# Patient Record
Sex: FEMALE | Race: ASIAN | NOT HISPANIC OR LATINO
[De-identification: names, ages, dates, MRNs, and addresses within clinical notes are randomized per-mention and may not be internally consistent; named-entity substitution may affect disease eponyms.]

---

## 2019-02-15 ENCOUNTER — APPOINTMENT (OUTPATIENT)
Dept: BREAST CENTER | Facility: CLINIC | Age: 49
End: 2019-02-15
Payer: MEDICAID

## 2019-02-15 VITALS
WEIGHT: 134 LBS | SYSTOLIC BLOOD PRESSURE: 110 MMHG | DIASTOLIC BLOOD PRESSURE: 80 MMHG | TEMPERATURE: 98.1 F | BODY MASS INDEX: 22.88 KG/M2 | HEIGHT: 64 IN

## 2019-02-15 DIAGNOSIS — R92.8 OTHER ABNORMAL AND INCONCLUSIVE FINDINGS ON DIAGNOSTIC IMAGING OF BREAST: ICD-10-CM

## 2019-02-15 DIAGNOSIS — Z80.3 FAMILY HISTORY OF MALIGNANT NEOPLASM OF BREAST: ICD-10-CM

## 2019-02-15 DIAGNOSIS — Z80.42 FAMILY HISTORY OF MALIGNANT NEOPLASM OF PROSTATE: ICD-10-CM

## 2019-02-15 PROCEDURE — 99203 OFFICE O/P NEW LOW 30 MIN: CPT

## 2019-02-19 PROBLEM — Z80.42 FAMILY HISTORY OF MALIGNANT NEOPLASM OF PROSTATE: Status: ACTIVE | Noted: 2019-02-19

## 2019-02-19 PROBLEM — Z80.3 FAMILY HISTORY OF BREAST CANCER: Status: ACTIVE | Noted: 2019-02-19

## 2019-03-20 NOTE — ASSESSMENT
[FreeTextEntry1] : Veronica is a 48 perimenopausal F who presents with benign high risk disease and bilateral benign breast biopsies. \par \par She has no suspicious findings on exam.  Her R breast calcifications were biopsy proven fibrocystic changes and her left breast mass @12:00, 4 cm FN was a fibroadenoma.  She will be due for a R dx mammogram and L breast US in 6 months.  This will be scheduled for her today. \par \par We discussed dense breasts.  Increasing breast density has been found to increase ones risk of breast cancer, but at this time, there is no clear indication for additional imaging in this setting, as both US and MRI have not been found to improve survival.  One can consider bilateral screening US.  However, out of 1000 women screened, the use of routine US will only identify an additional 3-5 cancers.  The use of US was found to increase the likelihood of undergoing more imaging and more biopsies.  She does have dense breasts.  We have decided to proceed with screening bilateral breast US at this time.  This will be scheduled with her next screening mammogram.\par \par We discussed fibroadenomas.  These are benign lesions without any malignant potential.  They are hormonally influenced and can increase or decrease in size and can also regress spontaneously.  They are considered proliferative lesions without atypia.  Patients with these lesions have been found to have a slightly increased relative risk of breast cancer compared to the reference population.  Surgical excision is recommended when the diagnosis in unclear, the lesion is causing pain or breast deformity, or if it is rapidly enlarging. \par \par The reason that we recommend surgical excision for a rapidly enlarging fibroadenoma is because there is a possibility that this could be a Phyllodes Tumor.  The treatment of this lesion is wide local excision with 1 cm margins.  A sentinel lymph node would not be necessary as this disease very rarely spreads to the lymph nodes. She is asymptomatic from her fibroadenoma so no acute surgical intervention will be performed at this time. \par \par Because of a family history of breast cancer in her mother, her risk assessment is as follows: \par \par RISK ASSESSMENT: \par Miriam\par 5yr -- 2.2%\par lifetime -- 19.4%\par \par TC v6 \par 5yr -- 3%\par lifetime -- 19.1%\par \par This puts her in the high risk category for breast cancer because she has a lifetime risk of >20%.  She qualifies for annual screening MRIs which would be done in an alternating fashion with her screening mammograms such that an imaging study and clinical breast exam would be performed every 6 months.  The use of MRIs have not been shown to prolong survival, however out of 1000 women screened, an additional 14-15 cancers will be identified.  The use of MRIs, has, however, been shown to increase the number of procedures and additional imaging because although it is a very sensitive test, it is not as specific.  This was discussed with the patient and she would like to proceed with screening MRIs.  This will be scheduled for 7/2019.\par  \par All of her questions were answered.  She knows to call with any further questions or concerns. \par \par PLAN\par -R dx mammogram and L breast US in 6 months \par -b/l breast MRI in 6 months \par -f/up after imaging

## 2019-03-20 NOTE — HISTORY OF PRESENT ILLNESS
[FreeTextEntry1] : Veronica is a 48 perimenopausal F who presents with benign high risk disease secondary to a family history of breast cancer in her mother and b/l benign breast biopsies. \par \par She has no breast related complaints at this time.  She denies any breast pain, has not palpated any new palpable masses in either breast and denies any nipple discharge or retraction.  \par \par Her first screening mammogram and subsequent work up was as follows: \par 19 -- b/l screening mammogram \par -heterogenously dense breasts \par -grouped calcifications in right breast @11:00\par -asymmetry in L, posterior depth, central to nipple \par -no other significant masses, calcifications bilaterally \par BIRADS 0 \par \par 19 -- b/l dx mammogram and L breast US \par -1 cm area of grouped amorphous calcifications in R @11:00, mid depth --> BIOPSY \par -9mm oval nodule with circumscribed margin in L @12:00, mid depth, correlates with US --> US BIOPSY\par L breast US \par -@12:00, 4 cm FN, 9 mm oval nodule with circumscribed margins, hypoechoic -> US BIOPSY \par \par 19 -- L US guided biopsy @12:00, 4 cm FN\par -fibroadenoma, fibrocystic changes \par \par 19 -- R stereotactic guided biopsy @12:00\par -columnar cell changes, fibrocystic changes\par \par HISTORICAL RISK FACTORS: \par -2 prior breast biopsies as above\par -family history of breast cancer in her mother, dx at age 68 \par -\par -no prior OCP use \par \par RISK ASSESSMENT: \par Miriam\par 5yr -- 2.2%\par lifetime -- 19.4%\par \par TC v6 \par 5yr -- 3%\par lifetime -- 19.1%

## 2019-03-20 NOTE — PAST MEDICAL HISTORY
[Menstruating] : The patient is menstruating [Menarche Age ____] : age at menarche was [unfilled] [Definite ___ (Date)] : the last menstrual period was [unfilled] [Total Preg ___] : G[unfilled] [History of Hormone Replacement Treatment] : has no history of hormone replacement treatment [FreeTextEntry5] : uterine myomectomy  [FreeTextEntry6] : denies [FreeTextEntry7] : denies [FreeTextEntry8] : denies

## 2019-03-20 NOTE — REVIEW OF SYSTEMS
Alert and oriented x 3, normal mood and affect, no apparent risk to self or others. [Abn Vaginal Bleeding] : unexplained vaginal bleeding [As Noted in HPI] : as noted in HPI [Negative] : Heme/Lymph [Fever] : no fever [Feeling Poorly] : not feeling poorly [Chills] : no chills [Skin Lesions] : no skin lesions [Feeling Tired] : not feeling tired [Breast Pain] : no breast pain [Skin Wound] : no skin wound [Breast Lump] : no breast lump [Hot Flashes] : no hot flashes

## 2019-03-20 NOTE — DATA REVIEWED
[FreeTextEntry1] : #[id number] MAMMO TOMOSYNTHESIS SCREENING BILATERAL: 1/4/2019\par \par CLINICAL INDICATION: Patient does report CBE in the past year.\par \par No prior exams were available for comparison.\par Tomosynthesis and 2D imaging of the breast(s) were performed.\par The tissue of both breasts is heterogeneously dense. This may lower the sensitivity of mammography.\par There are grouped calcifications in the right breast at 11 o'clock middle depth.\par There is an asymmetry in the left breast posterior depth central to the nipple seen on the craniocaudal view only.\par No other significant masses or calcifications are seen in either breast.\par Current study was also evaluated with a computer aided detection (CAD) system.\par \par IMPRESSION: INCOMPLETE: NEEDS ADDITIONAL IMAGING EVALUATION\par The grouped calcifications in the right breast at 11 o'clock middle depth are indeterminate. Magnification and lateral views are recommended.\par The asymmetry in the left breast posterior depth central to the nipple seen on the craniocaudal view only is indeterminate. Spot compression and lateral views as well as an ultrasound are recommended.\par \par \par \par The patient has been or will be contacted.\par \par \par Electronic Signature: I personally reviewed the images and agree with this report. Final Report: Signed by Attending Nubia Acosta MD\par /lucio:1/4/2019 10:41:14\par \par \par letter sent: Additional Imaging Needed\par Mammogram BI-RADS: 0 Incomplete: Needs Additional Imaging Evaluation\par \par  \par #[id number] MAMMO TOMOSYNTHESIS CALLBACK FROM SCREENING BILATERAL: 1/17/2019\par No prior exams were available for comparison.\par Tomosynthesis and 2D imaging of the breast(s) were performed.\par The tissue of both breasts is heterogeneously dense. This may lower the sensitivity of mammography.\par There is a 1 cm area of grouped amorphous calcifications in the right breast at 11 o'clock middle depth. These are seen in additional views.\par There is a 9 mm oval nodule with a circumscribed margin in the left breast at 12 o'clock middle depth. This is seen in additional views. This correlates with ultrasound findings.\par No other significant masses or calcifications are seen in either breast.\par Current study was also evaluated with a computer aided detection (CAD) system.\par \par IMPRESSION: SUSPICIOUS OF MALIGNANCY\par 1 cm area of grouped amorphous calcifications in the right breast at 11 o'clock middle depth. A stereotactic biopsy is recommended.\par Findings and recommendations were discussed with the patient at the time of the exam.\par 9 mm oval nodule in the left breast at 12 o'clock middle depth. An ultrasound guided biopsy is recommended.\par \par \par \par The patient will be sent a letter.\par \par \par Electronic Signature: I personally reviewed the images and agree with this report. Final Report: Signed by Kristina Saldana MD\par rb/:1/17/2019 12:59:00\par \par \par letter sent: Biopsy Required\par Mammogram BI-RADS: 4 Suspicious abnormality\par \par \par \par  \par #[id number] US BREAST LIMITED LEFT: 1/17/2019\par No prior exams were available for comparison.\par Ultrasound of the left breast was performed.\par There is a 9 mm oval nodule with a circumscribed margin in the left breast at 12 o'clock middle depth. This oval nodule is hypoechoic. This correlates with mammography findings.\par \par \par IMPRESSION: SUSPICIOUS OF MALIGNANCY - FOLLOW-UP RECOMMENDED\par Please see the mammogram report of the same date for additional findings and recommendations. Findings and recommendations were discussed with the patient at the time of the exam.\par 9 mm oval nodule in the left breast. An ultrasound guided biopsy is recommended.\par \par \par \par Electronic Signature: I personally reviewed the images and agree with this report. Final Report: Signed by Kristina Saldana MD\par rb/:1/17/2019 13:04:05\par \par \par letter sent: Biopsy Required\par Ultrasound BI-RADS: 4 Suspicious abnormality\par \par \par \par \par \par \par AMENDMENT: 2/14/2019 Kristina Saldana MD\par [place]\par [facility]\par [address] [place] [address], [place] [address]\par Phone: [phone] Fax: [phone]\par SPECIMEN NUMBER: [id number]\par COLLECTION DATE: 02/06/2019\par \par SPECIMENS:\par \par 1. BREAST, RIGHT, 11 O'CLOCK, W/ CALCS: CORE BIOPSY\par 2. BREAST, RIGHT, 11 O'CLOCK, TISSUE: CORE BIOPSY\par 3. BREAST, LEFT, 12 O'CLOCK, 4 CM FN: CORE BIOPSY\par \par DIAGNOSIS:\par \par 1. BREAST, RIGHT, 11 O'CLOCK, W/ CALCS: CORE BIOPSY\par - BREAST TISSUE SOWING COLUMNAR CELL CHANGES WITH ASSOCIATED\par CALCIFICATIONS.\par - CLEAR CELL CHANGES IN LOBULES.\par Note:\par Immunohistochemistry stain for E-Cadherin and p63 were examined.\par 2. BREAST, RIGHT, 11 O'CLOCK, TISSUE: CORE BIOPSY\par - BREAST TISSUE SHOWING FIBROCYSTIC CHANGES, COLUMNAR CELL CHANGES\par AND CLEAR CELL CHANGES IN LOBULES.\par 3. BREAST, LEFT, 12 O'CLOCK, 4 CM FN: CORE BIOPSY\par - FIBROADENOMA.\par - FIBROCYSTIC CHANGES.\par Nubia Herrera MD\par Pathologist\par \par \par \par \par \par These are benign concordant results. As per routine, a six-month follow-up right breast mammogram is advised. Patient will be notified of the above results and recommendations by the mammography coordinator.\par \par \par *******END OF ADDENDUM******\par \par \par #[id number] MAMMO STEREO CORE BREAST BIOPSY RIGHT: 2/6/2019\par PATIENT CONSENT: Benefits, risks and alternatives to the procedure were explained to the patient and informed written consent was obtained.\par \par Correlation is made to exams dated: 1/4/2019, 1/17/2019, and 1/17/2019 [facility].\par A stereotactic guided biopsy was performed for the concerning 1.5 cm area of calcifications located in the right axillary tail. This was described on the previous mammography report. The skin was prepped in the usual manner. Local anesthetic was administered to the access site. A skin nick was made in the breast. The abnormality was approached from the craniocaudal aspect using a prone table. A 9 gauge biopsy needle was placed adjacent to the abnormality under computer guidance and confirmatory stereotactic mammography images were obtained to document needle placement. Once the needle was documented to be in the correct location, twelve specimens were obtained using a vacuum assisted device. A Buckle clip was inserted into the biopsy cavity. Post procedure imaging demonstrates the clip at the targeted area. The specimens were sent to the laboratory for pathological analysis.\par \par \par IMPRESSION: STEREOTACTIC GUIDED BIOPSY\par Stereotactic guided biopsy of the 1.5 cm area of calcifications in the right axillary tail was successful. The imaged specimens includes the calcifications. Waiting for pathology results. A final report will be issued when these become available.\par \par \par Electronic Signature: I personally reviewed the images and agree with this report. Final Report: Signed by Kristina Saldana MD\par rb/penrad:2/6/2019 16:22:33\par \par \par \par \par \par #[id number] US BREAST CORE BIOPSY LEFT: 2/6/2019\par PATIENT CONSENT: Utilizing Universal protocol site and patient verification was performed prior to starting the procedure.\par \par After explaining risks, benefits and alternatives informed consent was obtained.\par \par Correlation is made to exams dated: 2/6/2019, 1/17/2019, 1/17/2019, and 1/4/2019 [facility].\par An ultrasound guided biopsy using real-time ultrasound was performed for the nodule located in the left breast at 12 o'clock middle depth. This was described on the previous ultrasound report. The skin was prepped in the usual manner. Local anesthetic was administered to the access site. A skin nick was made in the breast. A 12 gauge biopsy needle was placed adjacent to the abnormality under ultrasound guidance. Once the needle was documented to be in the correct location, three specimens were obtained using a vacuum assisted device. An Omega clip was inserted into the biopsy cavity. Post procedure mammographic imaging demonstrates the clip at the targeted area. The specimens were sent to the laboratory for pathological analysis.\par \par \par IMPRESSION: ULTRASOUND GUIDED BIOPSY\par Ultrasound guided biopsy of the nodule in the left breast at 12 o'clock middle depth was successful. Waiting for pathology results. A final report will be issued when these become available.\par \par \par Electronic Signature: I personally reviewed the images and agree with this report. Final Report: Signed by Kristina Saldana MD\par rb/penrad:2/6/2019 16:24:44\par \par \par \par --------------------------------------------------------------------------------\par AMENDMENT: 2/14/2019 Kristina Saldana MD\par \par \par SPECIMENS:\par \par 1. BREAST, RIGHT, 11 O'CLOCK, W/ CALCS: CORE BIOPSY\par 2. BREAST, RIGHT, 11 O'CLOCK, TISSUE: CORE BIOPSY\par 3. BREAST, LEFT, 12 O'CLOCK, 4 CM FN: CORE BIOPSY\par \par DIAGNOSIS:\par \par 1. BREAST, RIGHT, 11 O'CLOCK, W/ CALCS: CORE BIOPSY\par - BREAST TISSUE SOWING COLUMNAR CELL CHANGES WITH ASSOCIATED\par CALCIFICATIONS.\par - CLEAR CELL CHANGES IN LOBULES.\par Note:\par Immunohistochemistry stain for E-Cadherin and p63 were examined.\par 2. BREAST, RIGHT, 11 O'CLOCK, TISSUE: CORE BIOPSY\par - BREAST TISSUE SHOWING FIBROCYSTIC CHANGES, COLUMNAR CELL CHANGES\par AND CLEAR CELL CHANGES IN LOBULES.\par 3. BREAST, LEFT, 12 O'CLOCK, 4 CM FN: CORE BIOPSY\par - FIBROADENOMA.\par - FIBROCYSTIC CHANGES.\par Nubia Herrera MD\par Pathologist\par \par \par \par \par These are benign concordant results. As per routine, a six-month follow-up right breast mammogram is advised. Patient will be notified of the above results and recommendations by the mammography coordinator.\par \par \par *******END OF ADDENDUM******\par \par \par #[id number] US BREAST CORE BIOPSY LEFT: 2/6/2019\par PATIENT CONSENT: Utilizing Universal protocol site and patient verification was performed prior to starting the procedure.\par \par After explaining risks, benefits and alternatives informed consent was obtained.\par \par Correlation is made to exams dated: 2/6/2019, 1/17/2019, 1/17/2019, and 1/4/2019 Replaced by [facility].\par An ultrasound guided biopsy using real-time ultrasound was performed for the nodule located in the left breast at 12 o'clock middle depth. This was described on the previous ultrasound report. The skin was prepped in the usual manner. Local anesthetic was administered to the access site. A skin nick was made in the breast. A 12 gauge biopsy needle was placed adjacent to the abnormality under ultrasound guidance. Once the needle was documented to be in the correct location, three specimens were obtained using a vacuum assisted device. An Omega clip was inserted into the biopsy cavity. Post procedure mammographic imaging demonstrates the clip at the targeted area. The specimens were sent to the laboratory for pathological analysis.\par \par \par IMPRESSION: ULTRASOUND GUIDED BIOPSY\par Ultrasound guided biopsy of the nodule in the left breast at 12 o'clock middle depth was successful. Waiting for pathology results. A final report will be issued when these become available.\par \par \par Electronic Signature: I personally reviewed the images and agree with this report. Final Report: Signed by Kristina Saldana MD\par jamal/lucio:2/6/2019 16:24:44\par

## 2019-08-12 ENCOUNTER — APPOINTMENT (OUTPATIENT)
Dept: OTOLARYNGOLOGY | Facility: CLINIC | Age: 49
End: 2019-08-12
Payer: MEDICAID

## 2019-08-12 DIAGNOSIS — J34.89 OTHER SPECIFIED DISORDERS OF NOSE AND NASAL SINUSES: ICD-10-CM

## 2019-08-12 DIAGNOSIS — R59.0 LOCALIZED ENLARGED LYMPH NODES: ICD-10-CM

## 2019-08-12 DIAGNOSIS — R51 HEADACHE: ICD-10-CM

## 2019-08-12 PROCEDURE — 99203 OFFICE O/P NEW LOW 30 MIN: CPT | Mod: 25

## 2019-08-12 PROCEDURE — 31231 NASAL ENDOSCOPY DX: CPT

## 2019-08-12 NOTE — PROCEDURE
[Recalcitrant Symptoms] : recalcitrant symptoms  [Topical Lidocaine] : topical lidocaine [Oxymetazoline HCl] : oxymetazoline HCl [Rigid Endoscope] : examined with a rigid endoscope [Normal] : the paranasal sinuses had no abnormalities [FreeTextEntry6] : The following anatomic sites were directly examined in a sequential fashion:\par The scope was introduced in the nasal passage between the middle and inferior turbinates to exam the inferior portion of the middle meatus and the fontanelle, as well as the maxillary ostia. Next, the scope was passed medically and posteriorly to the middle turbinates to examine the sphenoethmoid recess and the superior turbinate region.\par

## 2019-08-12 NOTE — HISTORY OF PRESENT ILLNESS
[de-identified] : Patient also c/o enlarged lymph nodes x 5 years. Patient states she noticed it about 5 years ago and was evaluated by a physician and told to not be cancerous. Patient presents for an evaluation for present lymph nodes. No change in size. No pain associated with it. Denies dysphagia and odynophagia. Denies having images of the area. \par \par Patient presents today with recent episode of headache. Patient admits she had severe headaches a few months ago. The headaches have resolved. Headaches have become few and have decreased in intensity when present. \par

## 2019-08-12 NOTE — CONSULT LETTER
[Dear  ___] : Dear  [unfilled], [Consult Letter:] : I had the pleasure of evaluating your patient, [unfilled]. [Please see my note below.] : Please see my note below. [Consult Closing:] : Thank you very much for allowing me to participate in the care of this patient.  If you have any questions, please do not hesitate to contact me. [Sincerely,] : Sincerely, [FreeTextEntry3] : Shmuel Cazares MD FACS

## 2021-02-15 NOTE — PHYSICAL EXAM
[Normocephalic] : normocephalic [Atraumatic] : atraumatic [EOMI] : extra ocular movement intact [No Supraclavicular Adenopathy] : no supraclavicular adenopathy [No Cervical Adenopathy] : no cervical adenopathy [Examined in the supine and seated position] : examined in the supine and seated position [No dominant masses] : no dominant masses left breast [No Nipple Retraction] : no left nipple retraction [No Nipple Discharge] : no left nipple discharge [No Axillary Lymphadenopathy] : no left axillary lymphadenopathy [Soft] : abdomen soft [Not Tender] : non-tender [No Edema] : no edema [No Rashes] : no rashes [No Ulceration] : no ulceration [de-identified] : likely hematoma in the area of her recent stereotactic guided biopsy (superior breast, slightly lateral, @11:00, 3-4 cm FN), but no other suspicious lesions were palpated  [de-identified] : no suspicious masses were palpated, I was not able to palpate her biopsy proven fibroadenoma  Render Note In Bullet Format When Appropriate: No Consent: The patient's consent was obtained including but not limited to risks of crusting, scabbing, blistering, scarring, darker or lighter pigmentary change, recurrence, incomplete removal and infection. Render Post-Care Instructions In Note?: yes Detail Level: Detailed Post-Care Instructions: I reviewed with the patient in detail post-care instructions. Patient is to wear sunprotection, and avoid picking at any of the treated lesions. Pt may apply Vaseline to crusted or scabbing areas. Duration Of Freeze Thaw-Cycle (Seconds): 0

## 2022-03-10 ENCOUNTER — TRANSCRIPTION ENCOUNTER (OUTPATIENT)
Age: 52
End: 2022-03-10

## 2023-02-17 ENCOUNTER — APPOINTMENT (OUTPATIENT)
Dept: INTERNAL MEDICINE | Facility: CLINIC | Age: 53
End: 2023-02-17
Payer: MEDICAID

## 2023-02-17 ENCOUNTER — OUTPATIENT (OUTPATIENT)
Dept: OUTPATIENT SERVICES | Facility: HOSPITAL | Age: 53
LOS: 1 days | End: 2023-02-17
Payer: MEDICAID

## 2023-02-17 VITALS
DIASTOLIC BLOOD PRESSURE: 78 MMHG | WEIGHT: 128 LBS | HEIGHT: 64 IN | SYSTOLIC BLOOD PRESSURE: 116 MMHG | HEART RATE: 86 BPM | TEMPERATURE: 97.6 F | OXYGEN SATURATION: 94 % | BODY MASS INDEX: 21.85 KG/M2

## 2023-02-17 DIAGNOSIS — Z83.3 FAMILY HISTORY OF DIABETES MELLITUS: ICD-10-CM

## 2023-02-17 DIAGNOSIS — Z83.49 FAMILY HISTORY OF OTHER ENDOCRINE, NUTRITIONAL AND METABOLIC DISEASES: ICD-10-CM

## 2023-02-17 DIAGNOSIS — Z86.39 PERSONAL HISTORY OF OTHER ENDOCRINE, NUTRITIONAL AND METABOLIC DISEASE: ICD-10-CM

## 2023-02-17 DIAGNOSIS — Z00.00 ENCOUNTER FOR GENERAL ADULT MEDICAL EXAMINATION WITHOUT ABNORMAL FINDINGS: ICD-10-CM

## 2023-02-17 DIAGNOSIS — L85.3 XEROSIS CUTIS: ICD-10-CM

## 2023-02-17 PROCEDURE — 99396 PREV VISIT EST AGE 40-64: CPT

## 2023-02-17 PROCEDURE — 99396 PREV VISIT EST AGE 40-64: CPT | Mod: GC

## 2023-02-17 NOTE — REVIEW OF SYSTEMS
[Back Pain] : back pain [Negative] : Neurological [Joint Pain] : no joint pain [Joint Stiffness] : no joint stiffness [Joint Swelling] : no joint swelling [Muscle Pain] : no muscle pain [Itching] : no itching [Skin Rash] : no skin rash [de-identified] : dry skin

## 2023-02-17 NOTE — HISTORY OF PRESENT ILLNESS
[FreeTextEntry1] : establish care [de-identified] : 52 year old with PMHx of DM and HLD female presents to establish care. Patient reports back pain for 3 days, intermittent, non radiating. Denies any smoking or drinking.

## 2023-02-17 NOTE — PHYSICAL EXAM
[No Acute Distress] : no acute distress [Well Nourished] : well nourished [Well Developed] : well developed [Well-Appearing] : well-appearing [Normal Sclera/Conjunctiva] : normal sclera/conjunctiva [PERRL] : pupils equal round and reactive to light [EOMI] : extraocular movements intact [Normal Outer Ear/Nose] : the outer ears and nose were normal in appearance [Normal Oropharynx] : the oropharynx was normal [No JVD] : no jugular venous distention [Supple] : supple [Thyroid Normal, No Nodules] : the thyroid was normal and there were no nodules present [No Respiratory Distress] : no respiratory distress  [No Accessory Muscle Use] : no accessory muscle use [Clear to Auscultation] : lungs were clear to auscultation bilaterally [Normal Rate] : normal rate  [Regular Rhythm] : with a regular rhythm [Normal S1, S2] : normal S1 and S2 [No Murmur] : no murmur heard [No Varicosities] : no varicosities [No Edema] : there was no peripheral edema [Soft] : abdomen soft [Non Tender] : non-tender [Non-distended] : non-distended [Normal Bowel Sounds] : normal bowel sounds [Normal Posterior Cervical Nodes] : no posterior cervical lymphadenopathy [Normal Anterior Cervical Nodes] : no anterior cervical lymphadenopathy [No Joint Swelling] : no joint swelling [Grossly Normal Strength/Tone] : grossly normal strength/tone [No Rash] : no rash [Coordination Grossly Intact] : coordination grossly intact [No Focal Deficits] : no focal deficits [Normal Gait] : normal gait [Normal Affect] : the affect was normal [Normal Insight/Judgement] : insight and judgment were intact [de-identified] : small 1cm nodule, chronic for 10 years

## 2023-02-17 NOTE — ASSESSMENT
[FreeTextEntry1] : 52 year old with PMHx of DM and HLD female presents to establish care. \par \par #DM \par - A1c 7.5 in December as per patient\par - check A1c\par \par #HLD\par - check lipid profile\par \par #Dry Skin\par - prescribed ammonium lactate \par \par #Back pain\par - Robaxin 750mg x 10 days\par - Meloxicam 10mg daily x10 days\par \par #HCM\par - mammogram ordered\par - colonoscopy done 4 years ago, normal findings\par - refused pap smear\par - ordered routine labs\par - RTC 6 months and PRN

## 2023-02-20 DIAGNOSIS — L85.3 XEROSIS CUTIS: ICD-10-CM

## 2023-02-20 DIAGNOSIS — Z00.00 ENCOUNTER FOR GENERAL ADULT MEDICAL EXAMINATION WITHOUT ABNORMAL FINDINGS: ICD-10-CM

## 2023-02-20 DIAGNOSIS — E11.9 TYPE 2 DIABETES MELLITUS WITHOUT COMPLICATIONS: ICD-10-CM

## 2023-02-20 DIAGNOSIS — E78.5 HYPERLIPIDEMIA, UNSPECIFIED: ICD-10-CM

## 2023-02-20 DIAGNOSIS — M54.9 DORSALGIA, UNSPECIFIED: ICD-10-CM

## 2023-07-21 ENCOUNTER — APPOINTMENT (OUTPATIENT)
Dept: INTERNAL MEDICINE | Facility: CLINIC | Age: 53
End: 2023-07-21
Payer: MEDICAID

## 2023-07-21 ENCOUNTER — OUTPATIENT (OUTPATIENT)
Dept: OUTPATIENT SERVICES | Facility: HOSPITAL | Age: 53
LOS: 1 days | End: 2023-07-21
Payer: MEDICAID

## 2023-07-21 VITALS
BODY MASS INDEX: 22.71 KG/M2 | SYSTOLIC BLOOD PRESSURE: 120 MMHG | TEMPERATURE: 97.3 F | HEIGHT: 64 IN | DIASTOLIC BLOOD PRESSURE: 78 MMHG | OXYGEN SATURATION: 98 % | HEART RATE: 103 BPM | WEIGHT: 133 LBS

## 2023-07-21 DIAGNOSIS — Z12.39 ENCOUNTER FOR OTHER SCREENING FOR MALIGNANT NEOPLASM OF BREAST: ICD-10-CM

## 2023-07-21 DIAGNOSIS — Z00.00 ENCOUNTER FOR GENERAL ADULT MEDICAL EXAMINATION W/OUT ABNORMAL FINDINGS: ICD-10-CM

## 2023-07-21 DIAGNOSIS — M25.562 PAIN IN LEFT KNEE: ICD-10-CM

## 2023-07-21 DIAGNOSIS — M54.9 DORSALGIA, UNSPECIFIED: ICD-10-CM

## 2023-07-21 DIAGNOSIS — E78.5 HYPERLIPIDEMIA, UNSPECIFIED: ICD-10-CM

## 2023-07-21 DIAGNOSIS — E11.9 TYPE 2 DIABETES MELLITUS W/OUT COMPLICATIONS: ICD-10-CM

## 2023-07-21 DIAGNOSIS — M54.50 LOW BACK PAIN, UNSPECIFIED: ICD-10-CM

## 2023-07-21 DIAGNOSIS — Z00.00 ENCOUNTER FOR GENERAL ADULT MEDICAL EXAMINATION WITHOUT ABNORMAL FINDINGS: ICD-10-CM

## 2023-07-21 PROCEDURE — 99214 OFFICE O/P EST MOD 30 MIN: CPT | Mod: GC

## 2023-07-21 PROCEDURE — 99214 OFFICE O/P EST MOD 30 MIN: CPT

## 2023-07-21 RX ORDER — MELOXICAM 10 MG/1
10 CAPSULE ORAL
Qty: 10 | Refills: 0 | Status: DISCONTINUED | COMMUNITY
Start: 2023-02-17 | End: 2023-07-21

## 2023-07-21 RX ORDER — AMMONIUM LACTATE 12 %
12 CREAM (GRAM) TOPICAL TWICE DAILY
Qty: 1 | Refills: 0 | Status: DISCONTINUED | COMMUNITY
Start: 2023-02-17 | End: 2023-07-21

## 2023-07-21 RX ORDER — ATORVASTATIN CALCIUM 40 MG/1
40 TABLET, FILM COATED ORAL
Qty: 30 | Refills: 0 | Status: ACTIVE | COMMUNITY
Start: 2023-07-21 | End: 1900-01-01

## 2023-07-21 RX ORDER — EMPAGLIFLOZIN AND METFORMIN HYDROCHLORIDE 12.5; 1 MG/1; MG/1
TABLET ORAL
Refills: 0 | Status: DISCONTINUED | COMMUNITY
End: 2023-07-21

## 2023-07-21 RX ORDER — METHOCARBAMOL 750 MG/1
750 TABLET, FILM COATED ORAL
Qty: 10 | Refills: 0 | Status: DISCONTINUED | COMMUNITY
Start: 2023-02-17 | End: 2023-07-21

## 2023-07-21 RX ORDER — MELOXICAM 15 MG/1
15 TABLET ORAL DAILY
Qty: 14 | Refills: 0 | Status: ACTIVE | COMMUNITY
Start: 2023-07-21 | End: 1900-01-01

## 2023-07-21 RX ORDER — METFORMIN HYDROCHLORIDE 1000 MG/1
1000 TABLET, COATED ORAL DAILY
Qty: 30 | Refills: 0 | Status: ACTIVE | COMMUNITY
Start: 2023-07-21 | End: 1900-01-01

## 2023-07-21 NOTE — REVIEW OF SYSTEMS
[Shortness Of Breath] : shortness of breath [Joint Pain] : joint pain [Negative] : Constitutional [Anxiety] : anxiety [Fever] : no fever [Chills] : no chills [Fatigue] : no fatigue [Hot Flashes] : no hot flashes [Night Sweats] : no night sweats [Recent Change In Weight] : ~T no recent weight change [Discharge] : no discharge [Pain] : no pain [Redness] : no redness [Dryness] : no dryness  [Vision Problems] : no vision problems [Itching] : no itching [Chest Pain] : no chest pain [Palpitations] : no palpitations [Leg Claudication] : no leg claudication [Lower Ext Edema] : no lower extremity edema [Orthopnea] : no orthopnea [Paroxysmal Nocturnal Dyspnea] : no paroxysmal nocturnal dyspnea [Wheezing] : no wheezing [Cough] : no cough [Dyspnea on Exertion] : no dyspnea on exertion [Abdominal Pain] : no abdominal pain [Nausea] : no nausea [Constipation] : no constipation [Diarrhea] : diarrhea [Vomiting] : no vomiting [Heartburn] : no heartburn [Melena] : no melena [Dysuria] : no dysuria [Incontinence] : no incontinence [Hematuria] : no hematuria [Frequency] : no frequency [Vaginal Discharge] : no vaginal discharge [Mole Changes] : no mole changes [Nail Changes] : no nail changes [Hair Changes] : no hair changes [Skin Rash] : no skin rash [Headache] : no headache [Dizziness] : no dizziness [Fainting] : no fainting [Confusion] : no confusion [Unsteady Walking] : no ataxia [Suicidal] : not suicidal [Insomnia] : no insomnia [Depression] : no depression

## 2023-07-21 NOTE — HISTORY OF PRESENT ILLNESS
[de-identified] : 53F w/ PMHx of DM and HLD presents to clinic for follow up after [facility] ER visit for acute left knee pain. Pain started suddenly in the morning. Inititally imrpved with advil, but worsened and her inability to walk prompted her to visit the ED. a few days prior, she had a locking sensation of the knee. Xray in the ED showed no acute fracture. Discharged with no new medications, recommended tylenol and outpatient MRI. Reports only mild pain today on ambulation and stairs. [FreeTextEntry1] : f/u [facility] ER visit for acute left knee pain

## 2023-07-21 NOTE — PHYSICAL EXAM
[No Respiratory Distress] : no respiratory distress  [No Accessory Muscle Use] : no accessory muscle use [Clear to Auscultation] : lungs were clear to auscultation bilaterally [No Acute Distress] : no acute distress [Well Nourished] : well nourished [Well Developed] : well developed [Well-Appearing] : well-appearing [Normal Voice/Communication] : normal voice/communication [Normal Sclera/Conjunctiva] : normal sclera/conjunctiva [No JVD] : no jugular venous distention [No Lymphadenopathy] : no lymphadenopathy [Supple] : supple [Thyroid Normal, No Nodules] : the thyroid was normal and there were no nodules present [Normal Rate] : normal rate  [Regular Rhythm] : with a regular rhythm [Normal S1, S2] : normal S1 and S2 [No Murmur] : no murmur heard [Soft] : abdomen soft [Non Tender] : non-tender [Non-distended] : non-distended [No Masses] : no abdominal mass palpated [No HSM] : no HSM [Normal Bowel Sounds] : normal bowel sounds [Normal Supraclavicular Nodes] : no supraclavicular lymphadenopathy [Normal Posterior Cervical Nodes] : no posterior cervical lymphadenopathy [Normal Anterior Cervical Nodes] : no anterior cervical lymphadenopathy [No Rash] : no rash [No Skin Lesions] : no skin lesions [Coordination Grossly Intact] : coordination grossly intact [No Focal Deficits] : no focal deficits [Deep Tendon Reflexes (DTR)] : deep tendon reflexes were 2+ and symmetric [Normal Affect] : the affect was normal [Alert and Oriented x3] : oriented to person, place, and time [Normal Insight/Judgement] : insight and judgment were intact [de-identified] : mild tenderness and swelling of left knee, no pitting edema in b/l LE [de-identified] : slight gait abnormality 2/2 left knee pain [de-identified] : anxious

## 2023-07-22 DIAGNOSIS — E11.9 TYPE 2 DIABETES MELLITUS WITHOUT COMPLICATIONS: ICD-10-CM

## 2023-07-22 DIAGNOSIS — E78.5 HYPERLIPIDEMIA, UNSPECIFIED: ICD-10-CM

## 2023-07-22 DIAGNOSIS — M25.562 PAIN IN LEFT KNEE: ICD-10-CM

## 2023-07-22 DIAGNOSIS — M54.50 LOW BACK PAIN, UNSPECIFIED: ICD-10-CM

## 2023-07-22 DIAGNOSIS — Z12.39 ENCOUNTER FOR OTHER SCREENING FOR MALIGNANT NEOPLASM OF BREAST: ICD-10-CM

## 2023-08-04 ENCOUNTER — OUTPATIENT (OUTPATIENT)
Dept: OUTPATIENT SERVICES | Facility: HOSPITAL | Age: 53
LOS: 1 days | End: 2023-08-04
Payer: MEDICAID

## 2023-08-04 ENCOUNTER — APPOINTMENT (OUTPATIENT)
Dept: PODIATRY | Facility: CLINIC | Age: 53
End: 2023-08-04
Payer: MEDICAID

## 2023-08-04 DIAGNOSIS — Z00.00 ENCOUNTER FOR GENERAL ADULT MEDICAL EXAMINATION WITHOUT ABNORMAL FINDINGS: ICD-10-CM

## 2023-08-04 PROCEDURE — 99203 OFFICE O/P NEW LOW 30 MIN: CPT

## 2023-08-04 PROCEDURE — 11042 DBRDMT SUBQ TIS 1ST 20SQCM/<: CPT

## 2023-08-07 NOTE — PROCEDURE
[FreeTextEntry1] : Patient examined, history and chart reviewed Discussed risk of DM2 including Calluses, Pre-ulcerative lesions, ulcers, infections, bone infections, and amputation patient educated on diabetic foot health and maintenance at length  Follow up in 3 months or PRN

## 2023-08-07 NOTE — HISTORY OF PRESENT ILLNESS
[FreeTextEntry1] : MALCOM JACINTO   presents on 08/04/2023  for a Diabetic foot examination, patient was referred by PCP. Patient complains of numbness and pain of both feet and denies an acute injury.  Patient denies NVFC and denies SOB.

## 2023-08-07 NOTE — PHYSICAL EXAM
[General Appearance - Alert] : alert [General Appearance - In No Acute Distress] : in no acute distress [Ankle Swelling (On Exam)] : not present [Varicose Veins Of Lower Extremities] : not present [Delayed in the Right Toes] : capillary refills normal in right toes [Delayed in the Left Toes] : capillary refills normal in the left toes [2+] : right foot dorsalis pedis 2+ [No Joint Swelling] : no joint swelling [Normal Foot/Ankle] : Both lower extremities were exposed and visualized. Standing exam demonstrates normal foot posture and alignment. Hindfoot exam shows no hindfoot valgus or varus [Skin Color & Pigmentation] : normal skin color and pigmentation [Skin Turgor] : normal skin turgor [] : no rash [Skin Lesions] : no skin lesions [Foot Ulcer] : no foot ulcer [Skin Induration] : no skin induration

## 2023-08-08 DIAGNOSIS — E11.42 TYPE 2 DIABETES MELLITUS WITH DIABETIC POLYNEUROPATHY: ICD-10-CM

## 2023-08-08 DIAGNOSIS — L97.509 NON-PRESSURE CHRONIC ULCER OF OTHER PART OF UNSPECIFIED FOOT WITH UNSPECIFIED SEVERITY: ICD-10-CM

## 2023-08-08 DIAGNOSIS — M79.672 PAIN IN LEFT FOOT: ICD-10-CM

## 2023-08-08 DIAGNOSIS — E11.628 TYPE 2 DIABETES MELLITUS WITH OTHER SKIN COMPLICATIONS: ICD-10-CM

## 2023-08-11 ENCOUNTER — RESULT REVIEW (OUTPATIENT)
Age: 53
End: 2023-08-11

## 2023-08-11 ENCOUNTER — OUTPATIENT (OUTPATIENT)
Dept: OUTPATIENT SERVICES | Facility: HOSPITAL | Age: 53
LOS: 1 days | End: 2023-08-11
Payer: MEDICAID

## 2023-08-11 DIAGNOSIS — Z12.31 ENCOUNTER FOR SCREENING MAMMOGRAM FOR MALIGNANT NEOPLASM OF BREAST: ICD-10-CM

## 2023-08-11 PROCEDURE — 77063 BREAST TOMOSYNTHESIS BI: CPT

## 2023-08-11 PROCEDURE — 77067 SCR MAMMO BI INCL CAD: CPT | Mod: 26

## 2023-08-11 PROCEDURE — 77063 BREAST TOMOSYNTHESIS BI: CPT | Mod: 26

## 2023-08-11 PROCEDURE — 77067 SCR MAMMO BI INCL CAD: CPT

## 2023-08-12 DIAGNOSIS — Z12.31 ENCOUNTER FOR SCREENING MAMMOGRAM FOR MALIGNANT NEOPLASM OF BREAST: ICD-10-CM

## 2023-08-24 DIAGNOSIS — R92.1 MAMMOGRAPHIC CALCIFICATION FOUND ON DIAGNOSTIC IMAGING OF BREAST: ICD-10-CM

## 2023-09-01 ENCOUNTER — RESULT REVIEW (OUTPATIENT)
Age: 53
End: 2023-09-01

## 2023-09-01 ENCOUNTER — OUTPATIENT (OUTPATIENT)
Dept: OUTPATIENT SERVICES | Facility: HOSPITAL | Age: 53
LOS: 1 days | End: 2023-09-01
Payer: MEDICAID

## 2023-09-01 DIAGNOSIS — R92.8 OTHER ABNORMAL AND INCONCLUSIVE FINDINGS ON DIAGNOSTIC IMAGING OF BREAST: ICD-10-CM

## 2023-09-01 PROCEDURE — 77062 BREAST TOMOSYNTHESIS BI: CPT | Mod: 26

## 2023-09-01 PROCEDURE — 77066 DX MAMMO INCL CAD BI: CPT

## 2023-09-01 PROCEDURE — 77066 DX MAMMO INCL CAD BI: CPT | Mod: 26

## 2023-09-01 PROCEDURE — 76642 ULTRASOUND BREAST LIMITED: CPT | Mod: LT

## 2023-09-01 PROCEDURE — 76642 ULTRASOUND BREAST LIMITED: CPT | Mod: 26,LT

## 2023-09-01 PROCEDURE — G0279: CPT

## 2023-09-02 DIAGNOSIS — R92.8 OTHER ABNORMAL AND INCONCLUSIVE FINDINGS ON DIAGNOSTIC IMAGING OF BREAST: ICD-10-CM

## 2023-09-15 ENCOUNTER — APPOINTMENT (OUTPATIENT)
Dept: INTERNAL MEDICINE | Facility: CLINIC | Age: 53
End: 2023-09-15

## 2023-09-22 ENCOUNTER — APPOINTMENT (OUTPATIENT)
Dept: OPHTHALMOLOGY | Facility: CLINIC | Age: 53
End: 2023-09-22

## 2023-09-26 ENCOUNTER — APPOINTMENT (OUTPATIENT)
Dept: OBGYN | Facility: CLINIC | Age: 53
End: 2023-09-26

## 2023-11-17 ENCOUNTER — APPOINTMENT (OUTPATIENT)
Dept: OPHTHALMOLOGY | Facility: CLINIC | Age: 53
End: 2023-11-17